# Patient Record
Sex: FEMALE | Race: BLACK OR AFRICAN AMERICAN
[De-identification: names, ages, dates, MRNs, and addresses within clinical notes are randomized per-mention and may not be internally consistent; named-entity substitution may affect disease eponyms.]

---

## 2019-01-07 ENCOUNTER — HOSPITAL ENCOUNTER (EMERGENCY)
Dept: HOSPITAL 62 - ER | Age: 24
Discharge: HOME | End: 2019-01-07
Payer: COMMERCIAL

## 2019-01-07 VITALS — DIASTOLIC BLOOD PRESSURE: 71 MMHG | SYSTOLIC BLOOD PRESSURE: 120 MMHG
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
## 2019-01-07 DIAGNOSIS — S01.01XA: Primary | ICD-10-CM

## 2019-01-07 DIAGNOSIS — M54.2: ICD-10-CM

## 2019-01-07 DIAGNOSIS — R51: ICD-10-CM

## 2019-01-07 PROCEDURE — 70450 CT HEAD/BRAIN W/O DYE: CPT

## 2019-01-07 PROCEDURE — 72125 CT NECK SPINE W/O DYE: CPT

## 2019-01-07 PROCEDURE — 99284 EMERGENCY DEPT VISIT MOD MDM: CPT

## 2019-01-07 PROCEDURE — 12002 RPR S/N/AX/GEN/TRNK2.6-7.5CM: CPT
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
## 2019-01-12 ENCOUNTER — HOSPITAL ENCOUNTER (EMERGENCY)
Dept: HOSPITAL 62 - ER | Age: 24
Discharge: HOME | End: 2019-01-12
Payer: COMMERCIAL

## 2019-01-12 VITALS — DIASTOLIC BLOOD PRESSURE: 68 MMHG | SYSTOLIC BLOOD PRESSURE: 108 MMHG
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
## 2019-01-12 DIAGNOSIS — S01.91XD: Primary | ICD-10-CM

## 2019-01-12 PROCEDURE — 99281 EMR DPT VST MAYX REQ PHY/QHP: CPT

## 2019-01-12 NOTE — ER DOCUMENT REPORT
HPI





- HPI


Time Seen by Provider: 01/12/19 14:04


Pain Level: Denies





- REPRODUCTIVE


Reproductive: DENIES: Pregnant:





Past Medical History





- Social History


Family History: Reviewed & Not Pertinent


Renal/ Medical History: Denies: Hx Peritoneal Dialysis





- Immunizations


Hx Diphtheria, Pertussis, Tetanus Vaccination: Yes - 11/03/12





Vertical Provider Document





- INFECTION CONTROL


TRAVEL OUTSIDE OF THE [place] IN LAST 30 DAYS: No





Course





- Vital Signs


Vital signs: 


                                        











Temp Pulse Resp BP Pulse Ox


 


 98.6 F   74   16   108/68   100 


 


 01/12/19 13:40  01/12/19 13:40  01/12/19 13:40  01/12/19 13:40  01/12/19 13:40














Discharge





- Discharge


Clinical Impression: 


 Removal of staple





Condition: Stable


Disposition: HOME, SELF-CARE


Additional Instructions: 


Staple Removal





   Your staples have been removed.  Please follow the care instructions the 

doctor/nurse practitioner has outlined for you.  Unless instructed otherwise, 

you may get the wound wet and you do not need to continue bandaging it.  You may

begin to return to regular activities, but remember that it takes the inner 

tissues up to six weeks to fully heal.  Therefore, do not subject the wound to 

significant forces or stress as it may re-open.





   See the doctor immediately if any signs of infection occur such as swelling, 

redness, drainage of pus, increasing tenderness, red streaks, tender lumps in 

the armpit or groin above the laceration, or fever.





   Unless you are instructed otherwise, you should not need to be seen again for

any further evaluation of your wound.

## 2019-11-13 ENCOUNTER — HOSPITAL ENCOUNTER (EMERGENCY)
Dept: HOSPITAL 62 - ER | Age: 24
Discharge: HOME | End: 2019-11-13
Payer: MEDICAID

## 2019-11-13 VITALS — DIASTOLIC BLOOD PRESSURE: 57 MMHG | SYSTOLIC BLOOD PRESSURE: 97 MMHG

## 2019-11-13 DIAGNOSIS — R10.2: ICD-10-CM

## 2019-11-13 DIAGNOSIS — R10.9: ICD-10-CM

## 2019-11-13 DIAGNOSIS — N12: Primary | ICD-10-CM

## 2019-11-13 DIAGNOSIS — F17.200: ICD-10-CM

## 2019-11-13 DIAGNOSIS — R30.0: ICD-10-CM

## 2019-11-13 DIAGNOSIS — R50.9: ICD-10-CM

## 2019-11-13 LAB
ADD MANUAL DIFF: NO
ALBUMIN SERPL-MCNC: 4.7 G/DL (ref 3.5–5)
ALP SERPL-CCNC: 68 U/L (ref 38–126)
ANION GAP SERPL CALC-SCNC: 14 MMOL/L (ref 5–19)
APPEARANCE UR: (no result)
APTT PPP: YELLOW S
AST SERPL-CCNC: 18 U/L (ref 14–36)
BASOPHILS # BLD AUTO: 0.1 10^3/UL (ref 0–0.2)
BASOPHILS NFR BLD AUTO: 0.5 % (ref 0–2)
BILIRUB DIRECT SERPL-MCNC: 0.2 MG/DL (ref 0–0.4)
BILIRUB SERPL-MCNC: 0.7 MG/DL (ref 0.2–1.3)
BILIRUB UR QL STRIP: NEGATIVE
BUN SERPL-MCNC: 6 MG/DL (ref 7–20)
CALCIUM: 9.7 MG/DL (ref 8.4–10.2)
CHLORIDE SERPL-SCNC: 105 MMOL/L (ref 98–107)
CO2 SERPL-SCNC: 22 MMOL/L (ref 22–30)
EOSINOPHIL # BLD AUTO: 0 10^3/UL (ref 0–0.6)
EOSINOPHIL NFR BLD AUTO: 0 % (ref 0–6)
ERYTHROCYTE [DISTWIDTH] IN BLOOD BY AUTOMATED COUNT: 17.8 % (ref 11.5–14)
GLUCOSE SERPL-MCNC: 95 MG/DL (ref 75–110)
GLUCOSE UR STRIP-MCNC: NEGATIVE MG/DL
HCT VFR BLD CALC: 31.4 % (ref 36–47)
HGB BLD-MCNC: 9.8 G/DL (ref 12–15.5)
KETONES UR STRIP-MCNC: NEGATIVE MG/DL
LYMPHOCYTES # BLD AUTO: 1.2 10^3/UL (ref 0.5–4.7)
LYMPHOCYTES NFR BLD AUTO: 11.3 % (ref 13–45)
MCH RBC QN AUTO: 21.1 PG (ref 27–33.4)
MCHC RBC AUTO-ENTMCNC: 31.1 G/DL (ref 32–36)
MCV RBC AUTO: 68 FL (ref 80–97)
MONOCYTES # BLD AUTO: 0.7 10^3/UL (ref 0.1–1.4)
MONOCYTES NFR BLD AUTO: 6.6 % (ref 3–13)
NEUTROPHILS # BLD AUTO: 9.1 10^3/UL (ref 1.7–8.2)
NEUTS SEG NFR BLD AUTO: 81.6 % (ref 42–78)
NITRITE UR QL STRIP: NEGATIVE
PH UR STRIP: 6 [PH] (ref 5–9)
PLATELET # BLD: 493 10^3/UL (ref 150–450)
POTASSIUM SERPL-SCNC: 3.7 MMOL/L (ref 3.6–5)
PROT SERPL-MCNC: 8.2 G/DL (ref 6.3–8.2)
PROT UR STRIP-MCNC: 100 MG/DL
RBC # BLD AUTO: 4.62 10^6/UL (ref 3.72–5.28)
SP GR UR STRIP: 1.01
TOTAL CELLS COUNTED % (AUTO): 100 %
UROBILINOGEN UR-MCNC: NEGATIVE MG/DL (ref ?–2)
WBC # BLD AUTO: 11.1 10^3/UL (ref 4–10.5)

## 2019-11-13 PROCEDURE — 81001 URINALYSIS AUTO W/SCOPE: CPT

## 2019-11-13 PROCEDURE — 85025 COMPLETE CBC W/AUTO DIFF WBC: CPT

## 2019-11-13 PROCEDURE — 80053 COMPREHEN METABOLIC PANEL: CPT

## 2019-11-13 PROCEDURE — 36415 COLL VENOUS BLD VENIPUNCTURE: CPT

## 2019-11-13 PROCEDURE — 81025 URINE PREGNANCY TEST: CPT

## 2019-11-13 NOTE — ER DOCUMENT REPORT
ED General





- General


Chief Complaint: Flank Pain


Stated Complaint: PELVIC PAIN


Time Seen by Provider: 11/13/19 13:47


Mode of Arrival: Ambulatory


TRAVEL OUTSIDE OF THE [place] IN LAST 30 DAYS: No





- HPI


Onset: This morning


Onset/Duration: Sudden


Quality of pain: Achy


Severity: Moderate


Pain Level: 2


Context: 





23 year old female with dysuria and frequency for 2 days and tells me this feels

like a uti.  No vaginal discharge or concern for sti.  No rash.  A head cold in 

last 24 hours.  Children at home but no one sick.  Fever today.  Healthy young 

woman.


Exacerbated by: Denies


Relieved by: Denies


Similar symptoms previously: Yes - with uti


Recently seen / treated by doctor: No





- Related Data


Allergies/Adverse Reactions: 


                                        





nickel [Nickel] Adverse Reaction (Mild, Verified 01/28/16 05:45)


   rash; redness











Past Medical History





- General


Information source: Patient





- Social History


Smoking Status: Current Every Day Smoker


Chew tobacco use (# tins/day): No


Family History: Reviewed & Not Pertinent


Patient has suicidal ideation: No


Patient has homicidal ideation: No


Renal/ Medical History: Denies: Hx Peritoneal Dialysis





- Immunizations


Hx Diphtheria, Pertussis, Tetanus Vaccination: Yes - 11/03/12





Review of Systems





- Review of Systems


Constitutional: Fever


EENT: No symptoms reported


Cardiovascular: No symptoms reported


Respiratory: No symptoms reported


Gastrointestinal: No symptoms reported


Genitourinary: See HPI, Burning, Dysuria, Frequency, Flank pain - right


Female Genitourinary: No symptoms reported


Musculoskeletal: No symptoms reported


Skin: No symptoms reported


Hematologic/Lymphatic: No symptoms reported


Neurological/Psychological: No symptoms reported





Physical Exam





- Vital signs


Vitals: 


                                        











Temp Pulse Resp BP Pulse Ox


 


 102.6 F H  114 H  20   119/78   100 


 


 11/13/19 13:46  11/13/19 13:46  11/13/19 13:46  11/13/19 13:46  11/13/19 13:46











Interpretation: Normal





- General


General appearance: Appears well, Alert





- HEENT


Head: Normocephalic, Atraumatic


Eyes: Normal


Pupils: PERRL





- Respiratory


Respiratory status: No respiratory distress


Chest status: Nontender


Breath sounds: Normal


Chest palpation: Normal





- Cardiovascular


Rhythm: Regular


Heart sounds: Normal auscultation


Murmur: No





- Abdominal


Inspection: Normal


Distension: No distension


Bowel sounds: Normal


Tenderness: Nontender


Organomegaly: No organomegaly





- Back


Back: Normal, Tender - r cva ttp





- Extremities


General upper extremity: Normal inspection, Nontender, Normal color, Normal ROM,

 Normal temperature


General lower extremity: Normal inspection, Nontender, Normal color, Normal ROM,

 Normal temperature, Normal weight bearing.  No: Branden's sign





- Neurological


Neuro grossly intact: Yes


Cognition: Normal


Orientation: AAOx4


[place] Coma Scale Eye Opening: Spontaneous


[place] Coma Scale Verbal: Oriented


[place] Coma Scale Motor: Obeys Commands


[place] Coma Scale Total: 15


Speech: Normal


Motor strength normal: LUE, RUE, LLE, RLE


Sensory: Normal





- Psychological


Associated symptoms: Normal affect, Normal mood





- Skin


Skin Temperature: Warm


Skin Moisture: Dry


Skin Color: Normal





Course





- Re-evaluation


Re-evalutation: 





11/13/19 16:17


23 year old with right flank pain.  No abd ttp.  Considered but doubt appendix a

s etiology.  Despite this discussed return precautions - abd pain, fever or any 

other concerns. 





- Vital Signs


Vital signs: 


                                        











Temp Pulse Resp BP Pulse Ox


 


 102.6 F H  114 H  20   119/78   100 


 


 11/13/19 13:46  11/13/19 13:46  11/13/19 13:46  11/13/19 13:46  11/13/19 13:46














- Laboratory


Result Diagrams: 


                                 11/13/19 14:02





                                 11/13/19 14:02


Laboratory results interpreted by me: 


                                        











  11/13/19 11/13/19 11/13/19





  14:02 14:02 14:02


 


WBC  11.1 H  


 


Hgb  9.8 L  


 


Hct  31.4 L  


 


MCV  68 L  


 


MCH  21.1 L  


 


MCHC  31.1 L  


 


RDW  17.8 H  


 


Plt Count  493 H  


 


Lymph % (Auto)  11.3 L  


 


Absolute Neuts (auto)  9.1 H  


 


Seg Neutrophils %  81.6 H  


 


BUN   6 L 


 


Urine Protein    100 H


 


Urine Blood    MODERATE H


 


Ur Leukocyte Esterase    LARGE H














Discharge





- Discharge


Clinical Impression: 


 Pyelonephritis





Condition: Good


Disposition: HOME, SELF-CARE


Instructions:  Acetaminophen, Antibiotic Therapy ([facility]), Antinausea Medication 

([facility]), Pyelonephritis ([facility])


Additional Instructions: 


Rest, no work until Friday. Plenty of fluids.  Take your medicine as directed.  

Please return here for any problems or any concerns. 


Forms:  Return to Work

## 2019-11-13 NOTE — ER DOCUMENT REPORT
ED Medical Screen (RME)





- General


Chief Complaint: Pelvic Pain


Stated Complaint: PELVIC PAIN


Time Seen by Provider: 11/13/19 13:47


Mode of Arrival: Ambulatory


Information source: Patient


Notes: 





This 23-year-old female presents emergency department with complaints of low 

abdominal pain, pain with void since Saturday.  Reports at the end of her void 

it felt like she "was having orgasm" but she was not.  Reports fever and flank 

pain started yesterday.  Denies vaginal discharge.  Denies vomiting diarrhea.  

Last bowel movement 3 days ago last menstrual period at the end of October.  

Patient has not taken Tylenol or Motrin.  Temperature 102.6.  Patient has a coat

with a mcnamara on and a blanket wrapped around her.  She was instructed to remove 

the blanket.  Motrin ordered.








I have greeted and performed a rapid initial assessment of this patient.  A 

comprehensive ED assessment and evaluation of the patient, analysis of test 

results and completion of the medical decision making process will be conducted 

by additional ED providers.  Dictation of this chart was performed using voice 

recognition software; therefore, there may be some unintended grammatical 

errors.


TRAVEL OUTSIDE OF THE [place] IN LAST 30 DAYS: No





- Related Data


Allergies/Adverse Reactions: 


                                        





nickel [Nickel] Adverse Reaction (Mild, Verified 01/28/16 05:45)


   rash; redness











Past Medical History


Renal/ Medical History: Denies: Hx Peritoneal Dialysis





- Immunizations


Hx Diphtheria, Pertussis, Tetanus Vaccination: Yes - 11/03/12





Physical Exam





- Vital signs


Vitals: 





                                        











Temp Pulse Resp BP Pulse Ox


 


 102.6 F H  114 H  20   119/78   100 


 


 11/13/19 13:46  11/13/19 13:46  11/13/19 13:46  11/13/19 13:46  11/13/19 13:46














Course





- Vital Signs


Vital signs: 





                                        











Temp Pulse Resp BP Pulse Ox


 


 102.6 F H  114 H  20   119/78   100 


 


 11/13/19 13:46  11/13/19 13:46  11/13/19 13:46  11/13/19 13:46  11/13/19 13:46

## 2020-01-03 ENCOUNTER — HOSPITAL ENCOUNTER (EMERGENCY)
Dept: HOSPITAL 62 - ER | Age: 25
Discharge: HOME | End: 2020-01-03
Payer: MEDICAID

## 2020-01-03 VITALS — DIASTOLIC BLOOD PRESSURE: 61 MMHG | SYSTOLIC BLOOD PRESSURE: 94 MMHG

## 2020-01-03 DIAGNOSIS — F17.200: ICD-10-CM

## 2020-01-03 DIAGNOSIS — R51: ICD-10-CM

## 2020-01-03 DIAGNOSIS — N39.0: Primary | ICD-10-CM

## 2020-01-03 LAB
ADD MANUAL DIFF: NO
ALBUMIN SERPL-MCNC: 4.5 G/DL (ref 3.5–5)
ALP SERPL-CCNC: 51 U/L (ref 38–126)
ANION GAP SERPL CALC-SCNC: 9 MMOL/L (ref 5–19)
APPEARANCE UR: (no result)
APTT PPP: YELLOW S
AST SERPL-CCNC: 21 U/L (ref 14–36)
BASOPHILS # BLD AUTO: 0.1 10^3/UL (ref 0–0.2)
BASOPHILS NFR BLD AUTO: 0.8 % (ref 0–2)
BILIRUB DIRECT SERPL-MCNC: 0.2 MG/DL (ref 0–0.4)
BILIRUB SERPL-MCNC: 0.3 MG/DL (ref 0.2–1.3)
BILIRUB UR QL STRIP: NEGATIVE
BUN SERPL-MCNC: 10 MG/DL (ref 7–20)
CALCIUM: 9.6 MG/DL (ref 8.4–10.2)
CHLORIDE SERPL-SCNC: 107 MMOL/L (ref 98–107)
CO2 SERPL-SCNC: 26 MMOL/L (ref 22–30)
EOSINOPHIL # BLD AUTO: 0.1 10^3/UL (ref 0–0.6)
EOSINOPHIL NFR BLD AUTO: 1.6 % (ref 0–6)
ERYTHROCYTE [DISTWIDTH] IN BLOOD BY AUTOMATED COUNT: 19.3 % (ref 11.5–14)
GLUCOSE SERPL-MCNC: 63 MG/DL (ref 75–110)
GLUCOSE UR STRIP-MCNC: NEGATIVE MG/DL
HCT VFR BLD CALC: 31.1 % (ref 36–47)
HGB BLD-MCNC: 9.7 G/DL (ref 12–15.5)
KETONES UR STRIP-MCNC: NEGATIVE MG/DL
LYMPHOCYTES # BLD AUTO: 3.3 10^3/UL (ref 0.5–4.7)
LYMPHOCYTES NFR BLD AUTO: 37.7 % (ref 13–45)
MCH RBC QN AUTO: 21.5 PG (ref 27–33.4)
MCHC RBC AUTO-ENTMCNC: 31.3 G/DL (ref 32–36)
MCV RBC AUTO: 69 FL (ref 80–97)
MONOCYTES # BLD AUTO: 0.7 10^3/UL (ref 0.1–1.4)
MONOCYTES NFR BLD AUTO: 7.5 % (ref 3–13)
NEUTROPHILS # BLD AUTO: 4.6 10^3/UL (ref 1.7–8.2)
NEUTS SEG NFR BLD AUTO: 52.4 % (ref 42–78)
NITRITE UR QL STRIP: NEGATIVE
PH UR STRIP: 5 [PH] (ref 5–9)
PLATELET # BLD: 548 10^3/UL (ref 150–450)
POTASSIUM SERPL-SCNC: 3.9 MMOL/L (ref 3.6–5)
PROT SERPL-MCNC: 7.7 G/DL (ref 6.3–8.2)
PROT UR STRIP-MCNC: 30 MG/DL
RBC # BLD AUTO: 4.53 10^6/UL (ref 3.72–5.28)
SP GR UR STRIP: 1.03
TOTAL CELLS COUNTED % (AUTO): 100 %
UROBILINOGEN UR-MCNC: 2 MG/DL (ref ?–2)
WBC # BLD AUTO: 8.8 10^3/UL (ref 4–10.5)

## 2020-01-03 PROCEDURE — 96361 HYDRATE IV INFUSION ADD-ON: CPT

## 2020-01-03 PROCEDURE — 85025 COMPLETE CBC W/AUTO DIFF WBC: CPT

## 2020-01-03 PROCEDURE — 36415 COLL VENOUS BLD VENIPUNCTURE: CPT

## 2020-01-03 PROCEDURE — 96374 THER/PROPH/DIAG INJ IV PUSH: CPT

## 2020-01-03 PROCEDURE — 80053 COMPREHEN METABOLIC PANEL: CPT

## 2020-01-03 PROCEDURE — 99283 EMERGENCY DEPT VISIT LOW MDM: CPT

## 2020-01-03 PROCEDURE — 81025 URINE PREGNANCY TEST: CPT

## 2020-01-03 PROCEDURE — 82962 GLUCOSE BLOOD TEST: CPT

## 2020-01-03 PROCEDURE — 96375 TX/PRO/DX INJ NEW DRUG ADDON: CPT

## 2020-01-03 PROCEDURE — 81001 URINALYSIS AUTO W/SCOPE: CPT
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
